# Patient Record
Sex: MALE | Race: WHITE | HISPANIC OR LATINO | URBAN - METROPOLITAN AREA
[De-identification: names, ages, dates, MRNs, and addresses within clinical notes are randomized per-mention and may not be internally consistent; named-entity substitution may affect disease eponyms.]

---

## 2022-03-11 ENCOUNTER — TELEPHONE (OUTPATIENT)
Dept: OTHER | Facility: OTHER | Age: 52
End: 2022-03-11

## 2022-03-16 ENCOUNTER — TELEPHONE (OUTPATIENT)
Dept: GASTROENTEROLOGY | Facility: CLINIC | Age: 52
End: 2022-03-16

## 2022-03-16 NOTE — TELEPHONE ENCOUNTER
lvm on cell returning pt's call-vishal had called pt back Monday 3/14/22 pt stated "he only had 5 minutes until his next call" did not have time to schedule appointment

## 2022-04-28 ENCOUNTER — TELEPHONE (OUTPATIENT)
Dept: GASTROENTEROLOGY | Facility: CLINIC | Age: 52
End: 2022-04-28

## 2022-04-28 DIAGNOSIS — Z12.11 SCREENING FOR COLON CANCER: Primary | ICD-10-CM

## 2022-04-28 RX ORDER — POLYETHYLENE GLYCOL 3350, SODIUM CHLORIDE, SODIUM BICARBONATE, POTASSIUM CHLORIDE 420; 11.2; 5.72; 1.48 G/4L; G/4L; G/4L; G/4L
4000 POWDER, FOR SOLUTION ORAL ONCE
Qty: 4000 ML | Refills: 0 | Status: SHIPPED | OUTPATIENT
Start: 2022-04-28 | End: 2022-05-06 | Stop reason: HOSPADM

## 2022-04-28 NOTE — TELEPHONE ENCOUNTER
[facility]  Asked for alternative rx to be sent  Sent Nulytely(Gavilyte) to pharmacy

## 2022-04-28 NOTE — TELEPHONE ENCOUNTER
Scheduled date of colonoscopy (as of today):  5/6/22  Physician performing colonoscopy: Dr IBANEZ [facility]  [facility] of colonoscopy[facility]  Bowel prep reviewed with patient: kathy  Instructions reviewed with patient by: Skye Blue  Clearances: none

## 2022-05-06 ENCOUNTER — ANESTHESIA (OUTPATIENT)
Dept: GASTROENTEROLOGY | Facility: AMBULATORY SURGERY CENTER | Age: 52
End: 2022-05-06

## 2022-05-06 ENCOUNTER — ANESTHESIA EVENT (OUTPATIENT)
Dept: GASTROENTEROLOGY | Facility: AMBULATORY SURGERY CENTER | Age: 52
End: 2022-05-06

## 2022-05-06 ENCOUNTER — HOSPITAL ENCOUNTER (OUTPATIENT)
Dept: GASTROENTEROLOGY | Facility: AMBULATORY SURGERY CENTER | Age: 52
Discharge: HOME | End: 2022-05-06
Attending: INTERNAL MEDICINE
Payer: COMMERCIAL

## 2022-05-06 VITALS
HEART RATE: 43 BPM | SYSTOLIC BLOOD PRESSURE: 98 MMHG | BODY MASS INDEX: 20.89 KG/M2 | OXYGEN SATURATION: 100 % | DIASTOLIC BLOOD PRESSURE: 55 MMHG | TEMPERATURE: 97.7 F | RESPIRATION RATE: 19 BRPM | WEIGHT: 130 LBS | HEIGHT: 66 IN

## 2022-05-06 DIAGNOSIS — Z12.11 SCREENING FOR COLON CANCER: ICD-10-CM

## 2022-05-06 PROCEDURE — 45380 COLONOSCOPY AND BIOPSY: CPT | Performed by: INTERNAL MEDICINE

## 2022-05-06 PROCEDURE — 88305 TISSUE EXAM BY PATHOLOGIST: CPT | Performed by: PATHOLOGY

## 2022-05-06 RX ORDER — PROPOFOL 10 MG/ML
INJECTION, EMULSION INTRAVENOUS AS NEEDED
Status: DISCONTINUED | OUTPATIENT
Start: 2022-05-06 | End: 2022-05-06

## 2022-05-06 RX ORDER — SODIUM CHLORIDE 9 MG/ML
50 INJECTION, SOLUTION INTRAVENOUS CONTINUOUS
Status: DISCONTINUED | OUTPATIENT
Start: 2022-05-06 | End: 2022-05-10 | Stop reason: HOSPADM

## 2022-05-06 RX ADMIN — PROPOFOL 50 MG: 10 INJECTION, EMULSION INTRAVENOUS at 13:40

## 2022-05-06 RX ADMIN — SODIUM CHLORIDE 50 ML/HR: 9 INJECTION, SOLUTION INTRAVENOUS at 13:20

## 2022-05-06 RX ADMIN — PROPOFOL 30 MG: 10 INJECTION, EMULSION INTRAVENOUS at 13:46

## 2022-05-06 RX ADMIN — PROPOFOL 30 MG: 10 INJECTION, EMULSION INTRAVENOUS at 13:43

## 2022-05-06 RX ADMIN — PROPOFOL 120 MG: 10 INJECTION, EMULSION INTRAVENOUS at 13:33

## 2022-05-06 RX ADMIN — PROPOFOL 50 MG: 10 INJECTION, EMULSION INTRAVENOUS at 13:36

## 2022-05-06 NOTE — DISCHARGE INSTRUCTIONS
Colonoscopy   WHAT YOU NEED TO KNOW:   A colonoscopy is a procedure to examine the inside of your colon (intestine) with a scope  Polyps or tissue growths may have been removed during your colonoscopy  It is normal to feel bloated and to have some abdominal discomfort  You should be passing gas  If you have hemorrhoids or you had polyps removed, you may have a small amount of bleeding  DISCHARGE INSTRUCTIONS:   Seek care immediately if:    You have sudden, severe abdominal pain   You have problems swallowing   You have a large amount of black, sticky bowel movements or blood in your bowel movements   You have sudden trouble breathing   You feel weak, lightheaded, or faint or your heart beats faster than normal for you  Contact your healthcare provider if:    You have a fever and chills   You have nausea or are vomiting   Your abdomen is bloated or feels full and hard   You have abdominal pain   You have black, sticky bowel movements or blood in your bowel movements   You have not had a bowel movement for 3 days after your procedure   You have rash or hives   You have questions or concerns about your procedure  Activity:    Do not lift, strain, or run for 24 hours after your procedure   Rest after your procedure  You have been given medicine to relax you  Do not drive or make important decisions until the day after your procedure  Return to your normal activity as directed   Relieve gas and discomfort from bloating by lying on your right side with a heating pad on your abdomen  You may need to take short walks to help the gas move out  Eat small meals until bloating is relieved  Follow up with your healthcare provider as directed: Write down your questions so you remember to ask them during your visits  If you take a blood thinner, please review the specific instructions from your endoscopist about when you should resume it   These can be found in the Recommendation and Your Medication list sections of this After Visit Summary  Hemorrhoids   WHAT YOU NEED TO KNOW:   What are hemorrhoids? Hemorrhoids are swollen blood vessels inside your rectum (internal hemorrhoids) or on your anus (external hemorrhoids)  Sometimes a hemorrhoid may prolapse  This means it extends out of your anus  What increases my risk for hemorrhoids? · Pregnancy or obesity    · Straining or sitting for a long time during bowel movements    · Liver disease    · Weak muscles around the anus caused by older age, rectal surgery, or anal intercourse    · A lack of physical activity    · Chronic diarrhea or constipation    · A low-fiber diet    What are the signs and symptoms of hemorrhoids? · Pain or itching around your anus or inside your rectum    · Swelling or bumps around your anus    · Bright red blood in your bowel movement, on the toilet paper, or in the toilet bowl    · Tissue bulging out of your anus (prolapsed hemorrhoids)    · Incontinence (poor control over urine or bowel movements)    How are hemorrhoids diagnosed? Your healthcare provider will ask about your symptoms, the foods you eat, and your bowel movements  He or she will examine your anus for external hemorrhoids  You may need the following:  · A digital rectal exam  is a test to check for hemorrhoids  Your healthcare provider will put a gloved finger inside your anus to feel for the hemorrhoids  · An anoscopy  is a test that uses a scope (small tube with a light and camera on the end) to look at your hemorrhoids  How are hemorrhoids treated? Treatment will depend on your symptoms  You may need any of the following:  · Medicines  can help decrease pain and swelling, and soften your bowel movement  The medicine may be a pill, pad, cream, or ointment  · Procedures  may be used to shrink or remove your hemorrhoid  Examples include rubber-band ligation, sclerotherapy, and photocoagulation   These procedures may be done in your healthcare provider's office  Ask your healthcare provider for more information about these procedures  · Surgery  may be needed to shrink or remove your hemorrhoids  How can I manage my symptoms? · Apply ice on your anus for 15 to 20 minutes every hour or as directed  Use an ice pack, or put crushed ice in a plastic bag  Cover it with a towel before you apply it to your anus  Ice helps prevent tissue damage and decreases swelling and pain  · Take a sitz bath  Fill a bathtub with 4 to 6 inches of warm water  You may also use a sitz bath pan that fits inside a toilet bowl  Sit in the sitz bath for 15 minutes  Do this 3 times a day, and after each bowel movement  The warm water can help decrease pain and swelling  · Keep your anal area clean  Gently wash the area with warm water daily  Soap may irritate the area  After a bowel movement, wipe with moist towelettes or wet toilet paper  Dry toilet paper can irritate the area  How can I help prevent hemorrhoids? · Do not strain to have a bowel movement  Do not sit on the toilet too long  These actions can increase pressure on the tissues in your rectum and anus  · Drink plenty of liquids  Liquids can help prevent constipation  Ask how much liquid to drink each day and which liquids are best for you  · Eat a variety of high-fiber foods  Examples include fruits, vegetables, and whole grains  Ask your healthcare provider how much fiber you need each day  You may need to take a fiber supplement  · Exercise as directed  Exercise, such as walking, may make it easier to have a bowel movement  Ask your healthcare provider to help you create an exercise plan  · Do not have anal sex  Anal sex can weaken the skin around your rectum and anus  · Avoid heavy lifting  This can cause straining and increase your risk for another hemorrhoid  When should I seek immediate care?    · You have severe pain in your rectum or around your anus  · You have severe pain in your abdomen and you are vomiting  · You have bleeding from your anus that soaks through your underwear  When should I contact my healthcare provider? · You have frequent and painful bowel movements  · Your hemorrhoid looks or feels more swollen than usual      · You do not have a bowel movement for 2 days or more  · You see or feel tissue coming through your anus  · You have questions or concerns about your condition or care  CARE AGREEMENT:   You have the right to help plan your care  Learn about your health condition and how it may be treated  Discuss treatment options with your healthcare providers to decide what care you want to receive  You always have the right to refuse treatment  The above information is an  only  It is not intended as medical advice for individual conditions or treatments  Talk to your doctor, nurse or pharmacist before following any medical regimen to see if it is safe and effective for you  © Copyright [employer] 2022 Information is for End User's use only and may not be sold, redistributed or otherwise used for commercial purposes  All illustrations and images included in CareNotes® are the copyrighted property of A D A M , Inc  or [facility] Juliet Fernandez   Colorectal Polyps   WHAT YOU NEED TO KNOW:   What are colorectal polyps? Colorectal polyps are small growths of tissue in the lining of the colon and rectum  Most polyps are usually benign (not cancer)  Certain types of polyps, called adenomatous polyps, may turn into cancer  What increases my risk for colorectal polyps? The exact cause of colorectal polyps is unknown   The following may increase your risk:  · Older age    · Foods high in fat and low in fiber    · Family history of polyps    · Intestinal diseases, such as Crohn disease or ulcerative colitis    · Smoking cigarettes or drinking alcohol    · Lack of physical activity, such as exercise    · Obesity    What are the signs and symptoms of colorectal polyps? · Blood in your bowel movement or bleeding from the rectum    · Change in bowel movement habits, such as diarrhea or constipation    · Abdominal pain    What do I need to know about colorectal polyp screening and diagnosis? Screening means you are checked for polyps that may be cancer, even if you do not have signs or symptoms  Screening is recommended starting at age 48 and continuing to age 76 if you are at average risk  Your healthcare provider may suggest screening starting at age 39  Screening may start before you are 45 or continue after you are 75 if your risk is high  Your provider will tell you how often to get screened  Timing depends on the type of screening and if polyps are found  Timing also depends on your age and if you are at increased risk for cancer  Screening may be recommended every 1, 2, 5, or 10 years  Your healthcare provider will need to test polyps to find out if they are cancer  Any of the following may be used to find polyps:  · A digital rectal exam  means your provider will use a finger to check for polyps  · A barium enema  is an x-ray of the colon  A tube is put into your anus, and a liquid called barium is put through the tube  Barium is used so that healthcare providers can see your colon better on the x-ray film  · A virtual colonoscopy  is a CT scan that takes pictures of the inside of your colon and rectum  A small, flexible tube is put into your rectum and air or carbon dioxide (gas) is used to expand your colon  This lets healthcare providers clearly see your colon and any polyps on a monitor  · Colonoscopy or sigmoidoscopy  are procedures to help your provider see the inside of your colon  He or she will use a flexible tube with a small light and camera on the end  During a sigmoidoscopy, your provider will only look at rectum and lower colon   During a colonoscopy, he or she will look at the full length of your colon  A small amount of tissue may be removed from your colon for tests  How are colorectal polyps treated? Small, benign polyps may not need treatment  Your healthcare provider will check the polyp over time to make sure it is not changing  Polyps that are cancer may be removed with one of the following:  · A polypectomy  is a minimally invasive procedure to remove a polyp during a colonoscopy or sigmoidoscopy  Your healthcare provider may need to remove the polyp with a laparoscope  Laparoscopy is done by inserting a small, flexible scope into incisions made on your abdomen  · Surgery  may be needed to remove large or deep polyps that cannot be removed in a polypectomy  Tissues or lymph nodes near a polyp may also be removed  This helps stop cancer from spreading  What can I do to lower my risk for colorectal polyps? · Eat a variety of healthy foods  Healthy foods include fruit, vegetables, whole-grain breads, low-fat dairy products, beans, lean meat, and fish  Ask if you need to be on a special diet  · Maintain a healthy weight  Ask your healthcare provider what a healthy weight is for you  Ask him or her to help with a weight loss plan if you are overweight  · Exercise as directed  Begin to exercise slowly and do more as you get stronger  Talk with your healthcare provider before you start an exercise program          · Limit alcohol  Your risk for polyps increases the more you drink  · Do not smoke  Nicotine and other chemicals in cigarettes and cigars increase your risk for polyps  Ask your healthcare provider for information if you currently smoke and need help to quit  E-cigarettes or smokeless tobacco still contain nicotine  Talk to your healthcare provider before you use these products  Where can I find more information?    · [address] ([facility])  [address] [place] [address]  Phone: [phone]- [phone]  Web Address: www digestive  niddk nih gov    Call your local emergency number (911 in the [address]) if:   · You have sudden shortness of breath  · You have a fast heart rate, fast breathing, or are too dizzy to stand up  When should I seek immediate care? · You have severe abdominal pain  · You see blood in your bowel movement  When should I call my doctor? · You have a fever  · You have chills, a cough, or feel weak and achy  · You have abdominal pain that does not go away or gets worse after you take medicine  · Your abdomen is swollen  · You are losing weight without trying  · You have questions or concerns about your condition or care  CARE AGREEMENT:   You have the right to help plan your care  Learn about your health condition and how it may be treated  Discuss treatment options with your healthcare providers to decide what care you want to receive  You always have the right to refuse treatment  The above information is an  only  It is not intended as medical advice for individual conditions or treatments  Talk to your doctor, nurse or pharmacist before following any medical regimen to see if it is safe and effective for you  © Copyright [employer] 2022 Information is for End User's use only and may not be sold, redistributed or otherwise used for commercial purposes   All illustrations and images included in CareNotes® are the copyrighted property of A D A M , Inc  or [facility] [employer]

## 2022-05-06 NOTE — ANESTHESIA POSTPROCEDURE EVALUATION
Post-Op Assessment Note    CV Status:  Stable  Pain Score: 0    Pain management: adequate     Mental Status:  Awake   Hydration Status:  Stable   PONV Controlled:  None   Airway Patency:  Patent      Post Op Vitals Reviewed: Yes      Staff: CRNA         No complications documented      BP (!) 102/41 (05/06/22 1401)    Temp      Pulse (!) 45 (05/06/22 1401)   Resp 20 (05/06/22 1401)    SpO2 99 % (05/06/22 1401)

## 2022-05-06 NOTE — H&P
History and Physical - [facility]    Alexey Lucia 54 y o  male MRN: [id number]      HPI: Alexey Lucia is a 54y o  year old male who presents for colon cancer screening  First colonoscopy  Average risk  REVIEW OF SYSTEMS: Per the HPI, and otherwise unremarkable      Historical Information     Past Medical History:   Diagnosis Date    Patient denies medical problems      Past Surgical History:   Procedure Laterality Date    COLONOSCOPY  05/06/2022    NO PAST SURGERIES       Social History   Social History     Substance and Sexual Activity   Alcohol Use Yes    Comment: socially     Social History     Substance and Sexual Activity   Drug Use Never     Social History     Tobacco Use   Smoking Status Never Smoker   Smokeless Tobacco Never Used     Family History   Problem Relation Age of Onset    Colon cancer Neg Hx     Colon polyps Neg Hx        Meds/Allergies       Current Outpatient Medications:     CALCIUM PO    COLLAGEN PO    Glucosamine HCl (GLUCOSAMINE PO)    Multiple Vitamin (multivitamin) tablet    Omega-3 Fatty Acids (FISH OIL PO)    POLLEN EXTRACTS PO    Protein POWD    polyethylene glycol (COLYTE) 4000 mL solution    polyethylene glycol-electrolytes (NULYTELY) 4000 mL solution    Current Facility-Administered Medications:     sodium chloride 0 9 % infusion, 50 mL/hr, Intravenous, Continuous, 50 mL/hr at 05/06/22 1320    Allergies   Allergen Reactions    Sulfa Antibiotics Other (See Comments)     As child eyes puffed up and throat felt funny       Objective     /60   Pulse (!) 48   Temp 97 7 °F (36 5 °C) (Skin)   Resp 16   Ht 5' 6" (1 676 m)   Wt 59 kg (130 lb)   SpO2 100%   BMI 20 98 kg/m²       PHYSICAL EXAM    Gen: NAD AAOx3  Head: Normocephalic, Atraumatic  CV: S1S2 RRR no m/r/g  CHEST: Clear b/l no c/r/w  ABD: soft, +BS NT/ND no masses  EXT: no edema      ASSESSMENT/PLAN:  This is a 54y o  year old male here for colonoscopy, and he is stable and optimized for his procedure

## 2022-05-06 NOTE — ANESTHESIA PREPROCEDURE EVALUATION
Procedure:  COLONOSCOPY    Relevant Problems   No relevant active problems        Physical Exam    Airway    Mallampati score: II  TM Distance: >3 FB  Neck ROM: full     Dental   No notable dental hx     Cardiovascular  Cardiovascular exam normal    Pulmonary  Pulmonary exam normal     Other Findings        Anesthesia Plan  ASA Score- 1     Anesthesia Type- IV sedation with anesthesia with ASA Monitors  Additional Monitors:   Airway Plan:           Plan Factors-Exercise tolerance (METS): >4 METS  Chart reviewed  Patient summary reviewed  Patient is not a current smoker  Induction- intravenous  Postoperative Plan-     Informed Consent- Anesthetic plan and risks discussed with patient  I personally reviewed this patient with the CRNA  Discussed and agreed on the Anesthesia Plan with the CRNA  Alejandro Lopes

## 2022-08-05 ENCOUNTER — APPOINTMENT (OUTPATIENT)
Dept: LAB | Facility: CLINIC | Age: 52
End: 2022-08-05
Payer: COMMERCIAL

## 2022-08-05 DIAGNOSIS — Z00.8 ENCOUNTER FOR OTHER GENERAL EXAMINATION: ICD-10-CM

## 2022-08-05 LAB
CHOLEST SERPL-MCNC: 166 MG/DL
EST. AVERAGE GLUCOSE BLD GHB EST-MCNC: 108 MG/DL
HBA1C MFR BLD: 5.4 %
HDLC SERPL-MCNC: 41 MG/DL
LDLC SERPL CALC-MCNC: 108 MG/DL (ref 0–100)
NONHDLC SERPL-MCNC: 125 MG/DL
TRIGL SERPL-MCNC: 83 MG/DL

## 2022-08-05 PROCEDURE — 83036 HEMOGLOBIN GLYCOSYLATED A1C: CPT

## 2022-08-05 PROCEDURE — 80061 LIPID PANEL: CPT

## 2022-08-05 PROCEDURE — 36415 COLL VENOUS BLD VENIPUNCTURE: CPT

## 2024-02-04 ENCOUNTER — HOSPITAL ENCOUNTER (EMERGENCY)
Facility: HOSPITAL | Age: 54
Discharge: HOME | End: 2024-02-04
Attending: EMERGENCY MEDICINE
Payer: COMMERCIAL

## 2024-02-04 VITALS
RESPIRATION RATE: 18 BRPM | WEIGHT: 139.99 LBS | DIASTOLIC BLOOD PRESSURE: 56 MMHG | OXYGEN SATURATION: 98 % | BODY MASS INDEX: 22.6 KG/M2 | SYSTOLIC BLOOD PRESSURE: 111 MMHG | HEART RATE: 73 BPM | TEMPERATURE: 97.6 F

## 2024-02-04 DIAGNOSIS — Z20.9 EXPOSURE TO BAT WITHOUT KNOWN BITE: Primary | ICD-10-CM

## 2024-02-04 PROCEDURE — 99281 EMR DPT VST MAYX REQ PHY/QHP: CPT

## 2024-02-04 PROCEDURE — 90471 IMMUNIZATION ADMIN: CPT

## 2024-02-04 PROCEDURE — 90675 RABIES VACCINE IM: CPT | Performed by: PHYSICIAN ASSISTANT

## 2024-02-04 PROCEDURE — 99284 EMERGENCY DEPT VISIT MOD MDM: CPT | Performed by: PHYSICIAN ASSISTANT

## 2024-02-04 PROCEDURE — 96372 THER/PROPH/DIAG INJ SC/IM: CPT

## 2024-02-04 PROCEDURE — 90375 RABIES IG IM/SC: CPT | Performed by: PHYSICIAN ASSISTANT

## 2024-02-04 RX ADMIN — RABIES IMMUNE GLOBULIN (HUMAN) 1200 UNITS: 300 INJECTION, SOLUTION INFILTRATION; INTRAMUSCULAR at 12:35

## 2024-02-04 RX ADMIN — RABIES VIRUS STRAIN PM-1503-3M ANTIGEN (PROPIOLACTONE INACTIVATED) AND WATER 1 ML: KIT at 12:34

## 2024-02-04 NOTE — DISCHARGE INSTRUCTIONS
Follow up in 3 days for next rabies vaccination    Follow up with your priimary care provider or ED for untoward effects from today's vaccinations

## 2024-02-04 NOTE — ED PROVIDER NOTES
"History  Chief Complaint   Patient presents with    Bat Bite or Exposure     Pt states he was exposed to bat saliva. Did not get bit by bat but picked up a pair of gloves that had saliva on it.      Patient is a 53-year-old male presenting for rabies post exposure prophylaxis. States he handled a bat flying in a hallway of [facility] 2 days ago. States he got bat saliva on gloves. No known bite. Was advised by animal control that \"99% on bats\" in [place] test positive for rabies. Patient reports no prior rabies PEP in his lifetime. States last tetanus within 5 years.         Prior to Admission Medications   Prescriptions Last Dose Informant Patient Reported? Taking?   CALCIUM PO  Self Yes No   Sig: Take by mouth   COLLAGEN PO  Self Yes No   Sig: Take by mouth   Glucosamine HCl (GLUCOSAMINE PO)  Self Yes No   Sig: Take by mouth   Multiple Vitamin (multivitamin) tablet  Self Yes No   Sig: Take 1 tablet by mouth daily   Omega-3 Fatty Acids (FISH OIL PO)  Self Yes No   Sig: Take by mouth   POLLEN EXTRACTS PO  Self Yes No   Sig: Take by mouth   Protein POWD  Self Yes No   Sig: Take by mouth      Facility-Administered Medications: None       Past Medical History:   Diagnosis Date    Patient denies medical problems        Past Surgical History:   Procedure Laterality Date    COLONOSCOPY  05/06/2022    NO PAST SURGERIES         Family History   Problem Relation Age of Onset    Colon cancer Neg Hx     Colon polyps Neg Hx      I have reviewed and agree with the history as documented.    E-Cigarette/Vaping    E-Cigarette Use Never User      E-Cigarette/Vaping Substances    Nicotine No     Flavoring No      Social History     Tobacco Use    Smoking status: Never    Smokeless tobacco: Never   Vaping Use    Vaping status: Never Used   Substance Use Topics    Alcohol use: Yes     Comment: socially    Drug use: Never       Review of Systems   Constitutional:  Negative for fever.   Respiratory:  Negative for " shortness of breath.    Cardiovascular:  Negative for chest pain.   Gastrointestinal:  Negative for abdominal pain.   Skin:  Negative for wound.   Neurological:  Negative for dizziness, numbness and headaches.       Physical Exam  Physical Exam  Vitals and nursing note reviewed.   Constitutional:       General: He is not in acute distress.     Appearance: Normal appearance. He is not ill-appearing, toxic-appearing or diaphoretic.   Cardiovascular:      Rate and Rhythm: Normal rate and regular rhythm.      Pulses: Normal pulses.      Heart sounds: Normal heart sounds.   Pulmonary:      Effort: Pulmonary effort is normal.      Breath sounds: Normal breath sounds.   Abdominal:      General: Abdomen is flat. Bowel sounds are normal.   Musculoskeletal:         General: Normal range of motion.   Skin:     General: Skin is warm and dry.      Capillary Refill: Capillary refill takes less than 2 seconds.   Neurological:      General: No focal deficit present.      Mental Status: He is alert and oriented to person, place, and time. Mental status is at baseline.         Vital Signs  ED Triage Vitals [02/04/24 1212]   Temperature Pulse Respirations Blood Pressure SpO2   97.6 °F (36.4 °C) 63 18 111/56 98 %      Temp Source Heart Rate Source Patient Position - Orthostatic VS BP Location FiO2 (%)   Temporal Monitor Sitting Left arm --      Pain Score       No Pain           Vitals:    02/04/24 1212 02/04/24 1215   BP: 111/56 111/56   Pulse: 63 73   Patient Position - Orthostatic VS: Sitting          Visual Acuity      ED Medications  Medications   rabies vaccine, human diploid IM injection 1 mL (has no administration in time range)   rabies immune globulin, human (HyperRAB) injection 1,200 Units (has no administration in time range)       Diagnostic Studies  Results Reviewed       None                   No orders to display              Procedures  Procedures         ED Course                               SBIRT 22yo+       Flowsheet Row Most Recent Value   Initial Alcohol Screen: US AUDIT-C     1. How often do you have a drink containing alcohol? 1 Filed at: 02/04/2024 1212   2. How many drinks containing alcohol do you have on a typical day you are drinking?  0 Filed at: 02/04/2024 1212   3a. Male UNDER 65: How often do you have five or more drinks on one occasion? 0 Filed at: 02/04/2024 1212   3b. FEMALE Any Age, or MALE 65+: How often do you have 4 or more drinks on one occassion? 0 Filed at: 02/04/2024 1212   Audit-C Score 1 Filed at: 02/04/2024 1212   BONI: How many times in the past year have you...    Used an illegal drug or used a prescription medication for non-medical reasons? Never Filed at: 02/04/2024 1212                      Medical Decision Making  53-year-old male presenting for rabies postexposure prophylaxis after handling a bat 2 days ago while wearing gloves.  States he got back to live on his hands.  Advised by animal control that most bats in this area are positive for rabies and encouraged to get rabies prophylaxis.  Human rabies immunoglobulin was given.  First RabAvert was given.  Patient was advised to follow-up in 3 days for next rabies vaccination.  Return precautions reviewed.    Risk  Prescription drug management.             Disposition  Final diagnoses:   Exposure to bat without known bite     Time reflects when diagnosis was documented in both MDM as applicable and the Disposition within this note       Time User Action Codes Description Comment    2/4/2024 12:27 PM Jj Contreras Add [Z20.9] Exposure to bat without known bite           ED Disposition       ED Disposition   Discharge    Condition   Stable    Date/Time   Sun Feb 4, 2024 1227    Comment   Artem Foreman discharge to home/self care.                   Follow-up Information       Follow up With Specialties Details Why Contact Info    [facility]   [address]  [address]  [place] [id number] [phone]               Patient's Medications   Discharge Prescriptions    No medications on file       No discharge procedures on file.    PDMP Review       None            ED Provider  Electronically Signed by             Jj Contreras PA-C  02/04/24 4822

## 2024-02-07 ENCOUNTER — CLINICAL SUPPORT (OUTPATIENT)
Dept: URGENT CARE | Facility: CLINIC | Age: 54
End: 2024-02-07
Payer: COMMERCIAL

## 2024-02-07 VITALS
DIASTOLIC BLOOD PRESSURE: 61 MMHG | SYSTOLIC BLOOD PRESSURE: 117 MMHG | TEMPERATURE: 97.6 F | OXYGEN SATURATION: 98 % | RESPIRATION RATE: 18 BRPM | HEART RATE: 60 BPM

## 2024-02-07 DIAGNOSIS — Z23 NEED FOR VACCINATION: Primary | ICD-10-CM

## 2024-02-07 PROCEDURE — 90471 IMMUNIZATION ADMIN: CPT | Performed by: FAMILY MEDICINE

## 2024-02-07 PROCEDURE — 90675 RABIES VACCINE IM: CPT

## 2024-02-11 ENCOUNTER — CLINICAL SUPPORT (OUTPATIENT)
Dept: URGENT CARE | Facility: CLINIC | Age: 54
End: 2024-02-11
Payer: COMMERCIAL

## 2024-02-11 DIAGNOSIS — Z20.3 NEED FOR POST EXPOSURE PROPHYLAXIS FOR RABIES: Primary | ICD-10-CM

## 2024-02-11 PROCEDURE — 99213 OFFICE O/P EST LOW 20 MIN: CPT

## 2024-02-11 PROCEDURE — 90675 RABIES VACCINE IM: CPT

## 2024-02-11 PROCEDURE — 90471 IMMUNIZATION ADMIN: CPT | Performed by: FAMILY MEDICINE

## 2024-02-18 ENCOUNTER — CLINICAL SUPPORT (OUTPATIENT)
Dept: URGENT CARE | Facility: CLINIC | Age: 54
End: 2024-02-18
Payer: COMMERCIAL

## 2024-02-18 VITALS
OXYGEN SATURATION: 98 % | TEMPERATURE: 97 F | RESPIRATION RATE: 18 BRPM | SYSTOLIC BLOOD PRESSURE: 116 MMHG | DIASTOLIC BLOOD PRESSURE: 58 MMHG | HEART RATE: 50 BPM

## 2024-02-18 DIAGNOSIS — Z02.1 PRE-EMPLOYMENT EXAMINATION: Primary | ICD-10-CM

## 2024-02-18 PROCEDURE — 90471 IMMUNIZATION ADMIN: CPT

## 2024-02-18 PROCEDURE — 90675 RABIES VACCINE IM: CPT

## 2024-02-18 PROCEDURE — 99213 OFFICE O/P EST LOW 20 MIN: CPT
